# Patient Record
Sex: MALE
[De-identification: names, ages, dates, MRNs, and addresses within clinical notes are randomized per-mention and may not be internally consistent; named-entity substitution may affect disease eponyms.]

---

## 2023-09-08 ENCOUNTER — APPOINTMENT (OUTPATIENT)
Dept: UROLOGY | Facility: CLINIC | Age: 28
End: 2023-09-08
Payer: MEDICAID

## 2023-09-08 VITALS
HEIGHT: 69 IN | SYSTOLIC BLOOD PRESSURE: 120 MMHG | DIASTOLIC BLOOD PRESSURE: 80 MMHG | BODY MASS INDEX: 25.18 KG/M2 | WEIGHT: 170 LBS

## 2023-09-08 DIAGNOSIS — A49.3 NONSPECIFIC URETHRITIS: ICD-10-CM

## 2023-09-08 DIAGNOSIS — M62.89 OTHER SPECIFIED DISORDERS OF MUSCLE: ICD-10-CM

## 2023-09-08 DIAGNOSIS — N34.1 NONSPECIFIC URETHRITIS: ICD-10-CM

## 2023-09-08 PROBLEM — Z00.00 ENCOUNTER FOR PREVENTIVE HEALTH EXAMINATION: Status: ACTIVE | Noted: 2023-09-08

## 2023-09-08 PROCEDURE — 99203 OFFICE O/P NEW LOW 30 MIN: CPT

## 2023-09-08 RX ORDER — DOXYCYCLINE HYCLATE 100 MG/1
100 TABLET ORAL TWICE DAILY
Qty: 28 | Refills: 0 | Status: ACTIVE | COMMUNITY
Start: 2023-09-08 | End: 1900-01-01

## 2023-09-08 NOTE — HISTORY OF PRESENT ILLNESS
[FreeTextEntry1] : Language: Accompanied by: Contact info: Referring Provider/PCP: Dr. Alona Fabian     Initial H&P 09/08/2023: Mr. Head is a very pleasant 28-year-old gentleman who presents today for initial evaluation of dysuria.   Has been going on periodically for >1y.  Holds it for prolonged periods of time.   Denies weak stream, sensation of incomplete emptying,   Also has painful ejaculation. Denies painful erections.   Had unprotected oral sex --> got herpes.  Has never seen any lesions on his penis. Has had numerous   Recently had sex, condom broke. Partner was dx with ureaplasma --------------------------------------------------------------------------------------------------------------------------------------- PMHx: None PSHx: None SHx: denies x3, currently unemployed FHx: Grandfather - penile ca   All: NKDA   --------------------------------------------------------------------------------------------------------------------------------------- Physical Exam:   --------------------------------------------------------------------------------------------------------------------------------------- Results:   --------------------------------------------------------------------------------------------------------------------------------------- A/P:

## 2023-09-22 ENCOUNTER — APPOINTMENT (OUTPATIENT)
Dept: UROLOGY | Facility: CLINIC | Age: 28
End: 2023-09-22

## 2023-10-02 ENCOUNTER — APPOINTMENT (OUTPATIENT)
Dept: UROLOGY | Facility: CLINIC | Age: 28
End: 2023-10-02
Payer: MEDICAID

## 2023-10-02 VITALS
HEIGHT: 69 IN | BODY MASS INDEX: 25.18 KG/M2 | SYSTOLIC BLOOD PRESSURE: 122 MMHG | DIASTOLIC BLOOD PRESSURE: 80 MMHG | WEIGHT: 170 LBS

## 2023-10-02 DIAGNOSIS — R30.0 DYSURIA: ICD-10-CM

## 2023-10-02 PROCEDURE — 99213 OFFICE O/P EST LOW 20 MIN: CPT | Mod: 25

## 2023-10-02 PROCEDURE — 52000 CYSTOURETHROSCOPY: CPT
